# Patient Record
(demographics unavailable — no encounter records)

---

## 2019-01-17 NOTE — EKG
[place] [address]
Phone:  [phone]                     ELECTROCARDIOGRAM REPORT      
_______________________________________________________________________________
 
Name:       JOSSUE LIZAMA                    Room:                      [facility]#:  [id number]      Account #:      [id number]  
Admission:  19     Attend Phys:                         
Discharge:  19     Date of Birth:  72  
         Report #: [id number]
    [id number]
_______________________________________________________________________________
THIS REPORT FOR:  //name//                      
 
                         [facility] ED
                                       
Test Date:    2019               Test Time:    15:15:59
Pat Name:     JOSSUE LIZAMA                Department:   
Patient ID:   [id number]            Room:          
Gender:       F                        Technician:   ZHEN
:          1972               Requested By: Ivette Moya
Order Number: [id number] MD:   Rosas Brooks
                                 Measurements
Intervals                              Axis          
Rate:         107                      P:            36
MO:           143                      QRS:          85
QRSD:         107                      T:            5
QT:           352                                    
QTc:          470                                    
                           Interpretive Statements
Sinus tachycardia
Abnormal R-wave progression, late transition
Abnormal inferior Q waves
No previous ECG available for comparison
 
Electronically Signed On 2019 10:21:55 CST by Rosas Brooks
[email or web address]
 
 
 
 
 
 
 
 
 
 
 
 
 
 
 
 
 
 
  <ELECTRONICALLY SIGNED>
                                           By: Rosas Brooks MD, [facility]   
  19     1021
D: [id number]   _____________________________________
T: 19 151   Rosas Brooks MD, FACC     /EPI